# Patient Record
Sex: MALE | Race: WHITE | NOT HISPANIC OR LATINO | URBAN - METROPOLITAN AREA
[De-identification: names, ages, dates, MRNs, and addresses within clinical notes are randomized per-mention and may not be internally consistent; named-entity substitution may affect disease eponyms.]

---

## 2019-10-09 NOTE — PATIENT DISCUSSION
The patient feels that the cataract is significantly impacting daily activities and has elected cataract surgery. The risks, benefits, and alternatives to surgery were discussed. The patient elects to proceed with surgery.  Consult with DWS.

## 2019-10-23 NOTE — PROCEDURE NOTE: SURGICAL
MR #: [id number]<MM />  <br />  PREOPERATIVE DIAGNOSIS: Cataract, left eye.<br />  <br />  POSTOPERATIVE DIAGNOSIS: Same.<br />  <br />  OPERATIVE PROCEDURE: Phacoemulsification with +16.5 diopter Fabrice &amp; Fabrice AAB00, PC-IOL, left eye.<br />  <br />  PROCEDURE:&nbsp; Following sedation, Jeanine Velez CRNA, administered topical anesthesia in the form of lidocaine 2% gel as per the anesthetic record. <br />  <br />  Prior to commencing surgery patient identification, surgical procedure, site, and side were confirmed by Dr. Larry Cm. &nbsp; The patient was then prepped with Betadine and draped with sterile drapes. A lid speculum was placed and the side port incision prepared. &nbsp; 0.5 cc of Epi-Shugarcaine was instilled and the anterior chamber entered at the temporal 180° limbus in a single plane fashion employing a 2.7 mm trapezoid corrie and ring fixation. Viscoelastic was placed, a circular capsulorhexis performed, hydrodissection accomplished and the nucleus emulsified within the posterior chamber. Cortex was aspirated with the I/A handpiece, the posterior capsule polished and viscoelastic instilled to distend the capsular sac. A +16.5 diopter Fabrice &amp; Ashwin Stalling was placed into the bag under direct visualization without difficulty employing the microinjector. Viscoelastic was aspirated with the I/A handpiece and the anterior chamber pressurized with BSS. The incision was tested for leaks and none were found. A single injection of 0.2 cc of Moxifloxacin was placed in the anterior chamber. The patient returned to the holding area having tolerated the procedure extremely well and without complication. <br />  <br />  Epi-shugarcaine consists of a mixture of 1cc epinephrine MPF 1:1000, 0.75 cc 4% lidocaine MPF and 2.25 cc BSS. Moxifloxacin consists of a mixture of Vigamox and BSS 1 mg/1 ml solution. <br />  <br />  <br />

## 2019-10-24 NOTE — PATIENT DISCUSSION
Cataract surgery has been performed in the first eye and activities of daily living are still impaired. The patient would like to proceed with cataract surgery in the second eye as scheduled. The patient elects Basic OD, goal of priscila.

## 2019-10-30 NOTE — PATIENT DISCUSSION
Cataract surgery has been performed in the first eye and activities of daily living are still impaired. The patient would like to proceed with cataract surgery in the second eye as scheduled. The patient elects Basic + OD, goal of priscila.

## 2019-10-30 NOTE — PROCEDURE NOTE: SURGICAL
MR #: [id number]<UQ />  <br />  PREOPERATIVE DIAGNOSIS: Cataract, right eye.<br />  <br />  POSTOPERATIVE DIAGNOSIS: Same.<br />  <br />  OPERATIVE PROCEDURE: Phacoemulsification with +16.0 diopter Fabrice &amp; Fabrice AAB00, PC-IOL, right eye.<br />  <br />  PROCEDURE:&nbsp; Following sedation, Gerry Winter CRNA administered topical anesthesia in the form of lidocaine 2% gel as per the anesthetic record. <br />  <br />  Prior to commencing surgery patient identification, surgical procedure, site, and side were confirmed by Dr. Patricia Avalos. &nbsp; The patient was then prepped with Betadine and draped with sterile drapes. A lid speculum was placed and the side port incision prepared. &nbsp; 0.5 cc of Epi-Shugarcaine was instilled and the anterior chamber entered at the temporal 180° limbus in a single plane fashion employing a 2.7 mm trapezoid corrie and ring fixation. Viscoelastic was placed, a circular capsulorhexis performed, hydrodissection accomplished and the nucleus emulsified within the posterior chamber. Cortex was aspirated with the I/A handpiece, the posterior capsule polished and viscoelastic instilled to distend the capsular sac. A +16.0 diopter Fabrice &amp; Paulene Hermanns was placed into the bag under direct visualization without difficulty employing the microinjector. Viscoelastic was aspirated with the I/A handpiece and the anterior chamber pressurized with BSS. The incision was tested for leaks and none were found. A single injection of 0.2 cc of Moxifloxacin was placed in the anterior chamber. The patient returned to the holding area having tolerated the procedure extremely well and without complication. <br />  <br />  Epi-shugarcaine consists of a mixture of 1cc epinephrine MPF 1:1000, 0.75 cc 4% lidocaine MPF and 2.25 cc BSS. Moxifloxacin consists of a mixture of Vigamox and BSS 1 mg/1 ml solution. <br />  <br />  <br />

## 2021-12-21 ENCOUNTER — CONSULTATION/EVALUATION (OUTPATIENT)
Dept: URBAN - METROPOLITAN AREA CLINIC 49 | Facility: CLINIC | Age: 71
End: 2021-12-21

## 2021-12-21 DIAGNOSIS — H25.12: ICD-10-CM

## 2021-12-21 DIAGNOSIS — H35.373: ICD-10-CM

## 2021-12-21 DIAGNOSIS — H43.812: ICD-10-CM

## 2021-12-21 DIAGNOSIS — H25.11: ICD-10-CM

## 2021-12-21 PROCEDURE — 92134 CPTRZ OPH DX IMG PST SGM RTA: CPT

## 2021-12-21 PROCEDURE — 92004 COMPRE OPH EXAM NEW PT 1/>: CPT

## 2021-12-21 ASSESSMENT — VISUAL ACUITY
OU_CC: J1+ @ 14IN
OU_CC: 20/20
OD_CC: CF 6FT
OS_CC: J1+ @ 14IN
OD_GLARE: 20/200
OD_GLARE: 20/200
OD_PH: 20/200
OS_GLARE: 20/30
OS_CC: 20/25
OS_GLARE: 20/30
OD_CC: J16 @ 14IN

## 2021-12-21 ASSESSMENT — TONOMETRY
OS_IOP_MMHG: 14
OD_IOP_MMHG: 14

## 2021-12-21 NOTE — PATIENT DISCUSSION
Long discussion with patient about limited visual potential and possible distortion after cataract surgery secondary to macular history.

## 2021-12-31 ENCOUNTER — DIAGNOSTICS ONLY (OUTPATIENT)
Dept: URBAN - METROPOLITAN AREA CLINIC 49 | Facility: CLINIC | Age: 71
End: 2021-12-31

## 2021-12-31 DIAGNOSIS — H25.11: ICD-10-CM

## 2021-12-31 PROCEDURE — 92025IOL CORNEAL TOPOGRAPHY PREMIUM IOL

## 2021-12-31 PROCEDURE — 92136 OPHTHALMIC BIOMETRY: CPT

## 2021-12-31 ASSESSMENT — KERATOMETRY
OD_AXISANGLE2_DEGREES: 103
OS_AXISANGLE2_DEGREES: 078
OD_AXISANGLE_DEGREES: 13
OS_K1POWER_DIOPTERS: 43.37
OS_AXISANGLE_DEGREES: 168
OS_K2POWER_DIOPTERS: 42.25
OD_K2POWER_DIOPTERS: 43.00
OD_K1POWER_DIOPTERS: 43.75

## 2022-02-08 ENCOUNTER — PRE-OP/H&P (OUTPATIENT)
Dept: URBAN - METROPOLITAN AREA CLINIC 49 | Facility: CLINIC | Age: 72
End: 2022-02-08

## 2022-02-08 DIAGNOSIS — H25.11: ICD-10-CM

## 2022-02-08 DIAGNOSIS — H35.373: ICD-10-CM

## 2022-02-08 PROCEDURE — 92134 CPTRZ OPH DX IMG PST SGM RTA: CPT

## 2022-02-08 PROCEDURE — PREOP PRE OP VISIT

## 2022-02-08 ASSESSMENT — VISUAL ACUITY
OD_CC: 20/400
OD_PH: 20/200
OS_CC: 20/30

## 2022-02-08 ASSESSMENT — KERATOMETRY
OS_AXISANGLE_DEGREES: 168
OD_K1POWER_DIOPTERS: 43.75
OS_AXISANGLE2_DEGREES: 078
OD_K2POWER_DIOPTERS: 43.00
OS_K1POWER_DIOPTERS: 43.37
OS_K2POWER_DIOPTERS: 42.25
OD_AXISANGLE_DEGREES: 13
OD_AXISANGLE2_DEGREES: 103

## 2022-02-08 ASSESSMENT — TONOMETRY
OS_IOP_MMHG: 14
OD_IOP_MMHG: 14

## 2022-02-08 NOTE — PATIENT DISCUSSION
Patient understands vision may still be distorted after cataract surgery secondary to retinal history.

## 2022-02-08 NOTE — PATIENT DISCUSSION
CATARACT SURGERY PLANNER - STANDARD IOL/NO FEMTO: Phacoemulsification with IOL: Eye: OD|DOS: 2/24/2022|Model: AABOO|Power: 21. 5|Target: PLANO|Visc: DUET|Omidria: YES|10% Phenylephrine: YES|Epi-shugarcaine: YES|Phaco Setting: STD|TM: YES|Notes: Plan: AABOO Target [place] OD. OS not scheduled. Hx: ERM OD>>OS s/p Mac Peel OD in 2020. DILATES to 7MM.

## 2022-02-23 ASSESSMENT — KERATOMETRY
OS_AXISANGLE2_DEGREES: 078
OD_K1POWER_DIOPTERS: 43.75
OS_K1POWER_DIOPTERS: 43.37
OD_AXISANGLE_DEGREES: 13
OS_K2POWER_DIOPTERS: 42.25
OD_AXISANGLE2_DEGREES: 103
OD_K2POWER_DIOPTERS: 43.00
OS_AXISANGLE_DEGREES: 168

## 2022-02-24 ENCOUNTER — SURGERY/PROCEDURE (OUTPATIENT)
Dept: URBAN - METROPOLITAN AREA SURGERY 16 | Facility: SURGERY | Age: 72
End: 2022-02-24

## 2022-02-24 ENCOUNTER — POST-OP (OUTPATIENT)
Dept: URBAN - METROPOLITAN AREA CLINIC 48 | Facility: CLINIC | Age: 72
End: 2022-02-24

## 2022-02-24 DIAGNOSIS — Z98.41: ICD-10-CM

## 2022-02-24 DIAGNOSIS — Z96.1: ICD-10-CM

## 2022-02-24 DIAGNOSIS — H25.11: ICD-10-CM

## 2022-02-24 PROCEDURE — 66984 XCAPSL CTRC RMVL W/O ECP: CPT

## 2022-02-24 PROCEDURE — 99024 POSTOP FOLLOW-UP VISIT: CPT

## 2022-02-24 ASSESSMENT — KERATOMETRY
OS_K1POWER_DIOPTERS: 43.37
OD_AXISANGLE_DEGREES: 13
OS_AXISANGLE_DEGREES: 168
OD_K1POWER_DIOPTERS: 43.75
OS_AXISANGLE2_DEGREES: 078
OD_K2POWER_DIOPTERS: 43.00
OD_AXISANGLE2_DEGREES: 103
OS_K2POWER_DIOPTERS: 42.25

## 2022-02-24 ASSESSMENT — TONOMETRY: OD_IOP_MMHG: 19

## 2022-02-24 NOTE — PATIENT DISCUSSION
CATARACT SURGERY PLANNER - STANDARD IOL/NO FEMTO: Phacoemulsification with IOL: Eye: OD|DOS: 2/24/2022|Model: AABOO|Power: 21. 5|Target: PLANO|Visc: DUET|Omidria: YES|10% Phenylephrine: YES|Epi-shugarcaine: YES|Phaco Setting: STD|TM: YES|Notes: Plan: AABOO Target [place] OD. OS not scheduled. Hx: ERM OD>>OS s/p Mac Peel OD in 2020. DILATES to 7MM.

## 2022-02-24 NOTE — PATIENT DISCUSSION
CATARACT SURGERY PLANNER - STANDARD IOL/NO FEMTO: Phacoemulsification with IOL: Eye: OD|DOS: 2/24/2022|Model: AABOO|Power: 21. 5|Target: PLANO|Visc: DUET|Omidria: YES|10% Phenylephrine: YES|Epi-shugarcaine: YES|Phaco Setting: STD|TM: YES|Notes: Plan: AABOO Target [place] OD. OS not scheduled. Hx: ERM OD>>OS s/p Mac Peel OD in 2020. DILATES to 7MM.

## 2022-03-01 ENCOUNTER — POST-OP (OUTPATIENT)
Dept: URBAN - METROPOLITAN AREA CLINIC 49 | Facility: CLINIC | Age: 72
End: 2022-03-01

## 2022-03-01 DIAGNOSIS — H35.373: ICD-10-CM

## 2022-03-01 DIAGNOSIS — Z96.1: ICD-10-CM

## 2022-03-01 DIAGNOSIS — Z98.41: ICD-10-CM

## 2022-03-01 PROCEDURE — 99024 POSTOP FOLLOW-UP VISIT: CPT

## 2022-03-01 PROCEDURE — 92134 CPTRZ OPH DX IMG PST SGM RTA: CPT

## 2022-03-01 ASSESSMENT — VISUAL ACUITY
OD_SC: 20/50
OD_PH: 20/40

## 2022-03-01 ASSESSMENT — TONOMETRY
OD_IOP_MMHG: 16
OS_IOP_MMHG: 16

## 2022-03-29 ENCOUNTER — POST-OP (OUTPATIENT)
Dept: URBAN - METROPOLITAN AREA CLINIC 49 | Facility: CLINIC | Age: 72
End: 2022-03-29

## 2022-03-29 DIAGNOSIS — Z98.41: ICD-10-CM

## 2022-03-29 PROCEDURE — 99024 POSTOP FOLLOW-UP VISIT: CPT

## 2022-03-29 PROCEDURE — 92015 DETERMINE REFRACTIVE STATE: CPT

## 2022-03-29 ASSESSMENT — TONOMETRY
OD_IOP_MMHG: 16
OS_IOP_MMHG: 16

## 2022-03-29 ASSESSMENT — VISUAL ACUITY
OD_SC: 20/70
OD_PH: 20/50
OS_CC: 20/20-2

## 2022-11-16 ENCOUNTER — ESTABLISHED PATIENT (OUTPATIENT)
Dept: URBAN - METROPOLITAN AREA CLINIC 49 | Facility: CLINIC | Age: 72
End: 2022-11-16

## 2022-11-16 DIAGNOSIS — H25.12: ICD-10-CM

## 2022-11-16 DIAGNOSIS — H35.373: ICD-10-CM

## 2022-11-16 PROCEDURE — 92134 CPTRZ OPH DX IMG PST SGM RTA: CPT

## 2022-11-16 PROCEDURE — 92014 COMPRE OPH EXAM EST PT 1/>: CPT

## 2022-11-16 ASSESSMENT — VISUAL ACUITY
OS_GLARE: 20/30
OU_CC: 20/20
OD_CC: 20/40
OS_CC: 20/20
OU_CC: J1+
OD_GLARE: 20/40
OS_GLARE: 20/25
OD_CC: J2
OS_CC: J1+
OD_GLARE: 20/50

## 2022-11-16 ASSESSMENT — TONOMETRY
OD_IOP_MMHG: 15
OS_IOP_MMHG: 14

## 2024-07-24 ENCOUNTER — COMPREHENSIVE EXAM (OUTPATIENT)
Dept: URBAN - METROPOLITAN AREA CLINIC 49 | Facility: LOCATION | Age: 74
End: 2024-07-24

## 2024-07-24 DIAGNOSIS — H43.812: ICD-10-CM

## 2024-07-24 DIAGNOSIS — H52.4: ICD-10-CM

## 2024-07-24 DIAGNOSIS — H35.371: ICD-10-CM

## 2024-07-24 DIAGNOSIS — H25.12: ICD-10-CM

## 2024-07-24 DIAGNOSIS — H26.491: ICD-10-CM

## 2024-07-24 DIAGNOSIS — H35.033: ICD-10-CM

## 2024-07-24 PROCEDURE — 92134 CPTRZ OPH DX IMG PST SGM RTA: CPT

## 2024-07-24 PROCEDURE — 99214 OFFICE O/P EST MOD 30 MIN: CPT

## 2024-07-24 PROCEDURE — 92015 DETERMINE REFRACTIVE STATE: CPT

## 2024-07-24 ASSESSMENT — VISUAL ACUITY
OS_GLARE: 20/50
OU_CC: J1
OS_GLARE: 20/40
OS_CC: 20/30
OD_CC: 20/70+2

## 2024-07-24 ASSESSMENT — TONOMETRY
OS_IOP_MMHG: 14
OD_IOP_MMHG: 14

## 2025-04-07 NOTE — PATIENT DISCUSSION
Health Maintenance       Diabetes Eye Exam (Yearly)  Overdue since 10/18/2022    Shingles Vaccine (2 of 2)  Overdue since 12/27/2023    COVID-19 Vaccine (7 - Mixed Product risk 2024-25 season)  Overdue since 3/26/2025    Diabetes Foot Exam (Yearly)  Due soon on 5/31/2025    Traditional Medicare- Medicare Wellness Visit (Yearly)  Due soon on 8/1/2025           Following review of the above:  Patient is not proceeding with: Diabetes Eye Exam, COVID-19, and Shingles    Note: Refer to final orders and clinician documentation.       Continue current management.